# Patient Record
Sex: FEMALE | Race: WHITE | URBAN - METROPOLITAN AREA
[De-identification: names, ages, dates, MRNs, and addresses within clinical notes are randomized per-mention and may not be internally consistent; named-entity substitution may affect disease eponyms.]

---

## 2021-08-13 ENCOUNTER — HOSPITAL ENCOUNTER (EMERGENCY)
Facility: CLINIC | Age: 36
Discharge: LEFT BEFORE DISCHARGE | End: 2021-08-13
Admitting: EMERGENCY MEDICINE
Payer: COMMERCIAL

## 2021-08-13 PROCEDURE — 999N000104 HC STATISTIC NO CHARGE

## 2021-08-13 NOTE — ED TRIAGE NOTES
"Patient states \"fast HR literally just stopped now\".  States it is feeling normal now & states \"I will call my cardiologist\".  Instructed to return if any worsening symptoms or new concerns.  Left per pedis.  Skin appears dry, pink.  Respirations equal, non-labored.   "

## 2021-08-26 ENCOUNTER — TELEPHONE (OUTPATIENT)
Dept: CARDIOLOGY | Facility: CLINIC | Age: 36
End: 2021-08-26

## 2021-08-26 DIAGNOSIS — I47.10 SVT (SUPRAVENTRICULAR TACHYCARDIA) (H): ICD-10-CM

## 2021-08-26 NOTE — TELEPHONE ENCOUNTER
Spoke to pt who is scheduled to see Dr Blackmon on 8/31. Pt states that Dr Blackmon diagnosed pt back in 2007 with SVT.  There are no records in Epic that note this.  Pt states that she had moved to [place] for a period of time and has since moved back to [place]. Pt had an episode of SVT that lasted 30 minutes. Pt went to ER, but once she sat down it stopped. Pt wants to re-establish care with Dr Blackmon.  Pt states that she does wear a apple watch and did get an EKG of the event and will either send via [employer] or bring with her to OV.  Pt states that her father has A Fib and is also seen at this clinic. Flory

## 2021-08-31 ENCOUNTER — OFFICE VISIT (OUTPATIENT)
Dept: CARDIOLOGY | Facility: CLINIC | Age: 36
End: 2021-08-31
Payer: COMMERCIAL

## 2021-08-31 VITALS
DIASTOLIC BLOOD PRESSURE: 83 MMHG | WEIGHT: 163.3 LBS | OXYGEN SATURATION: 96 % | HEART RATE: 92 BPM | BODY MASS INDEX: 25.63 KG/M2 | HEIGHT: 67 IN | SYSTOLIC BLOOD PRESSURE: 120 MMHG

## 2021-08-31 DIAGNOSIS — I47.10 SVT (SUPRAVENTRICULAR TACHYCARDIA) (H): Primary | ICD-10-CM

## 2021-08-31 PROCEDURE — 99203 OFFICE O/P NEW LOW 30 MIN: CPT | Performed by: INTERNAL MEDICINE

## 2021-08-31 RX ORDER — PROPRANOLOL HYDROCHLORIDE 10 MG/1
10 TABLET ORAL 3 TIMES DAILY PRN
Qty: 90 TABLET | Refills: 3 | Status: SHIPPED | OUTPATIENT
Start: 2021-08-31

## 2021-08-31 RX ORDER — BUPROPION HYDROCHLORIDE 150 MG/1
150 TABLET ORAL EVERY MORNING
COMMUNITY

## 2021-08-31 ASSESSMENT — MIFFLIN-ST. JEOR: SCORE: 1473.35

## 2021-08-31 NOTE — PROGRESS NOTES
Service Date: 2021    Thank you for allowing me to participate in the care of your delightful patient.  As you know, Ginette is a 35-year-old female whom I had the pleasure of meeting for the first time in 2017 for palpitations, for which I suspected to be PSVT, but unfortunately we were never able to document it.  Since then, she has been having more episodes, but fortunately she was able to capture with her iWatch clearly showing an SVT at a rate of 180 beats per minute with sudden onset and termination.  It lasted for 1/2 hour and terminated after she sat down and rubbed her neck with a cold towel.    We discussed at length about the potential causes of SVT.  Most of the time it is sporadic.  Her dad does have atrial fibrillation, and therefore she could be at risk of having AFib, but presently not SVT.  Next, we discussed treatment options, includin.  Close observation.  2.  Medication such as a beta blocker on a p.r.n. basis.  3.  Ablation.      Since she is a busy mother of 3 young children and things have somewhat stabilized, the patient opted for option 2.  She used to take propranolol for ADHD.  I would prescribe 10 mg t.i.d. p.r.n.  I also went over Valsalva maneuvers, including modified one should she have SVT in the future.  If Ginette is doing quite well by this time next year, but would like a refill of her propranolol, I can gladly fill it over the phone without having to see her again, as I know that she is quite busy with being a mother.    Yaya Blackmon MD        D: 2021   T: 2021   MT: kaiden    Name:     GINETTE ABREU  MRN:      [id number]        Account:      [id number]   :      1985           Service Date: 2021       Document: [id number]

## 2021-08-31 NOTE — PROGRESS NOTES
HPI and Plan:   See dictation  [id number]  No orders of the defined types were placed in this encounter.      Orders Placed This Encounter   Medications     sertraline (ZOLOFT) 50 MG tablet     Sig: Take 50 mg by mouth 2 times daily     buPROPion (WELLBUTRIN XL) 150 MG 24 hr tablet     Sig: Take 150 mg by mouth every morning     propranolol (INDERAL) 10 MG tablet     Sig: Take 1 tablet (10 mg) by mouth 3 times daily as needed (palpitations)     Dispense:  90 tablet     Refill:  3       There are no discontinued medications.      Encounter Diagnosis   Name Primary?     SVT (supraventricular tachycardia) (H) Yes       CURRENT MEDICATIONS:  Current Outpatient Medications   Medication Sig Dispense Refill     buPROPion (WELLBUTRIN XL) 150 MG 24 hr tablet Take 150 mg by mouth every morning       propranolol (INDERAL) 10 MG tablet Take 1 tablet (10 mg) by mouth 3 times daily as needed (palpitations) 90 tablet 3     sertraline (ZOLOFT) 50 MG tablet Take 50 mg by mouth 2 times daily         ALLERGIES   No Known Allergies    PAST MEDICAL HISTORY:  Past Medical History:   Diagnosis Date     SVT (supraventricular tachycardia) (H)        PAST SURGICAL HISTORY:  History reviewed. No pertinent surgical history.    FAMILY HISTORY:  History reviewed. No pertinent family history.    SOCIAL HISTORY:  Social History     Socioeconomic History     Marital status:      Spouse name: None     Number of children: None     Years of education: None     Highest education level: None   Occupational History     None   Tobacco Use     Smoking status: Never Smoker     Smokeless tobacco: Never Used   Substance and Sexual Activity     Alcohol use: Yes     Comment: 1 glass per night     Drug use: No     Sexual activity: None   Other Topics Concern     Parent/sibling w/ CABG, MI or angioplasty before 65F 55M? Not Asked   Social History Narrative     None     Social Determinants of Health     Financial Resource Strain:      Difficulty of Paying  "Living Expenses:    Food Insecurity:      Worried About Running Out of Food in the Last Year:      Ran Out of Food in the Last Year:    Transportation Needs:      Lack of Transportation (Medical):      Lack of Transportation (Non-Medical):    Physical Activity:      Days of Exercise per Week:      Minutes of Exercise per Session:    Stress:      Feeling of Stress :    Social Connections:      Frequency of Communication with Friends and Family:      Frequency of Social Gatherings with Friends and Family:      Attends Voodoo Services:      Active Member of Clubs or Organizations:      Attends Club or Organization Meetings:      Marital Status:    Intimate Partner Violence:      Fear of Current or Ex-Partner:      Emotionally Abused:      Physically Abused:      Sexually Abused:        Review of Systems:  Skin:  Negative       Eyes:    glasses;contacts wears contacts more often.  ENT:  Negative      Respiratory:  Negative       Cardiovascular:  Negative for;chest pain;edema;syncope or near-syncope;exercise intolerance;lightheadedness;fatigue Positive for;palpitations;dizziness dizzy with anxiety  Gastroenterology: Negative      Genitourinary:  Negative      Musculoskeletal:  Negative      Neurologic:  Positive for numbness or tingling of hands;headaches numbness only at night when she sleeps on her side for too long.  Psychiatric:  Positive for anxiety;depression    Heme/Lymph/Imm:    allergies    Endocrine:  Negative        Physical Exam:  Vitals: /83 (BP Location: Left arm, Patient Position: Sitting, Cuff Size: Adult Regular)   Pulse 92   Ht 1.702 m (5' 7\")   Wt 74.1 kg (163 lb 4.8 oz)   SpO2 96%   BMI 25.58 kg/m      Constitutional:  cooperative, alert and oriented, well developed, well nourished, in no acute distress        Skin:  warm and dry to the touch, no apparent skin lesions or masses noted          Head:  normocephalic, no masses or lesions        Eyes:           Lymph:      ENT:  no pallor or " cyanosis        Neck:  carotid pulses are full and equal bilaterally, JVP normal, no carotid bruit        Respiratory:  normal breath sounds, clear to auscultation, normal A-P diameter, normal symmetry, normal respiratory excursion, no use of accessory muscles         Cardiac: regular rhythm, normal S1/S2, no S3 or S4, apical impulse not displaced, no murmurs, gallops or rubs                pulses full and equal, no bruits auscultated                                        GI:  abdomen soft, non-tender, BS normoactive, no mass, no HSM, no bruits        Extremities and Muscular Skeletal:  no deformities, clubbing, cyanosis, erythema observed              Neurological:  no gross motor deficits        Psych:  Alert and Oriented x 3        CC  No referring provider defined for this encounter.

## 2021-08-31 NOTE — LETTER
8/31/2021    MD Keeley Bojorquez [address] [place] [address] [place] [id number]    RE: Sara Cabral       Dear Colleague,    I had the pleasure of seeing Sara Cabral in the [facility].    HPI and Plan:   See dictation  [id number]  No orders of the defined types were placed in this encounter.      Orders Placed This Encounter   Medications     sertraline (ZOLOFT) 50 MG tablet     Sig: Take 50 mg by mouth 2 times daily     buPROPion (WELLBUTRIN XL) 150 MG 24 hr tablet     Sig: Take 150 mg by mouth every morning     propranolol (INDERAL) 10 MG tablet     Sig: Take 1 tablet (10 mg) by mouth 3 times daily as needed (palpitations)     Dispense:  90 tablet     Refill:  3       There are no discontinued medications.      Encounter Diagnosis   Name Primary?     SVT (supraventricular tachycardia) (H) Yes       CURRENT MEDICATIONS:  Current Outpatient Medications   Medication Sig Dispense Refill     buPROPion (WELLBUTRIN XL) 150 MG 24 hr tablet Take 150 mg by mouth every morning       propranolol (INDERAL) 10 MG tablet Take 1 tablet (10 mg) by mouth 3 times daily as needed (palpitations) 90 tablet 3     sertraline (ZOLOFT) 50 MG tablet Take 50 mg by mouth 2 times daily         ALLERGIES   No Known Allergies    PAST MEDICAL HISTORY:  Past Medical History:   Diagnosis Date     SVT (supraventricular tachycardia) (H)        PAST SURGICAL HISTORY:  History reviewed. No pertinent surgical history.    FAMILY HISTORY:  History reviewed. No pertinent family history.    SOCIAL HISTORY:  Social History     Socioeconomic History     Marital status:      Spouse name: None     Number of children: None     Years of education: None     Highest education level: None   Occupational History     None   Tobacco Use     Smoking status: Never Smoker     Smokeless tobacco: Never Used   Substance and Sexual Activity     Alcohol use: Yes     Comment: 1 glass per  "night     Drug use: No     Sexual activity: None   Other Topics Concern     Parent/sibling w/ CABG, MI or angioplasty before 65F 55M? Not Asked   Social History Narrative     None     Social Determinants of Health     Financial Resource Strain:      Difficulty of Paying Living Expenses:    Food Insecurity:      Worried About Running Out of Food in the Last Year:      Ran Out of Food in the Last Year:    Transportation Needs:      Lack of Transportation (Medical):      Lack of Transportation (Non-Medical):    Physical Activity:      Days of Exercise per Week:      Minutes of Exercise per Session:    Stress:      Feeling of Stress :    Social Connections:      Frequency of Communication with Friends and Family:      Frequency of Social Gatherings with Friends and Family:      Attends [employer] Services:      Active Member of Clubs or Organizations:      Attends Club or Organization Meetings:      Marital Status:    Intimate Partner Violence:      Fear of Current or Ex-Partner:      Emotionally Abused:      Physically Abused:      Sexually Abused:        Review of Systems:  Skin:  Negative       Eyes:    glasses;contacts wears contacts more often.  ENT:  Negative      Respiratory:  Negative       Cardiovascular:  Negative for;chest pain;edema;syncope or near-syncope;exercise intolerance;lightheadedness;fatigue Positive for;palpitations;dizziness dizzy with anxiety  Gastroenterology: Negative      Genitourinary:  Negative      Musculoskeletal:  Negative      Neurologic:  Positive for numbness or tingling of hands;headaches numbness only at night when she sleeps on her side for too long.  Psychiatric:  Positive for anxiety;depression    Heme/Lymph/Imm:    allergies    Endocrine:  Negative        Physical Exam:  Vitals: /83 (BP Location: Left arm, Patient Position: Sitting, Cuff Size: Adult Regular)   Pulse 92   Ht 1.702 m (5' 7\")   Wt 74.1 kg (163 lb 4.8 oz)   SpO2 96%   BMI 25.58 kg/m      Constitutional:  " cooperative, alert and oriented, well developed, well nourished, in no acute distress        Skin:  warm and dry to the touch, no apparent skin lesions or masses noted          Head:  normocephalic, no masses or lesions        Eyes:           Lymph:      ENT:  no pallor or cyanosis        Neck:  carotid pulses are full and equal bilaterally, JVP normal, no carotid bruit        Respiratory:  normal breath sounds, clear to auscultation, normal A-P diameter, normal symmetry, normal respiratory excursion, no use of accessory muscles         Cardiac: regular rhythm, normal S1/S2, no S3 or S4, apical impulse not displaced, no murmurs, gallops or rubs                pulses full and equal, no bruits auscultated                                        GI:  abdomen soft, non-tender, BS normoactive, no mass, no HSM, no bruits        Extremities and Muscular Skeletal:  no deformities, clubbing, cyanosis, erythema observed              Neurological:  no gross motor deficits        Psych:  Alert and Oriented x 3        CC  No referring provider defined for this encounter.                  Thank you for allowing me to participate in the care of your patient.      Sincerely,     Yaya Green MD     [facility]  cc:   No referring provider defined for this encounter.